# Patient Record
(demographics unavailable — no encounter records)

---

## 2024-11-19 NOTE — CONSULT LETTER
[Dear  ___] : Dear  [unfilled], [Courtesy Letter:] : I had the pleasure of seeing your patient, [unfilled], in my office today. [Please see my note below.] : Please see my note below. [Consult Closing:] : Thank you very much for allowing me to participate in the care of this patient.  If you have any questions, please do not hesitate to contact me. [Sincerely,] : Sincerely, [FreeTextEntry2] : Dr Soares Blank  [FreeTextEntry3] : \par  Kenrick Best MD, FACS\par  \par  Otolaryngology-Head and Neck Surgery\par  [employer] at [facility]\par

## 2024-11-19 NOTE — HISTORY OF PRESENT ILLNESS
[de-identified] : This is a 42 yro female patient who was referred by Dr. Mckeon for thyroid nodules.  FNA right thyroid  from 5/10/2022 was benign.  [place] [facility] [place]  labs 7/8/2023: TFTs were wnl CT neck done 12/18/2023 and 11/13/2024.  Pt noticed that her neck got "fatter" in the last few months, reports that she has also gained some weight.  Pt states the discomfort with swallowing when laying flat on back is still present, so she lays more on her side.  No dysphagia with food, pressure in the neck, odynophagia, choking, dysphonia or dyspnea. No fever, chills, or weight loss. Eating and drinking without issues.    [place] [facility] [place]:  IMPRESSION: heterogeneous appearance of the thyroid gland, which is enlarged and can be based on thyroiditis.  Bilateral TR3 and TR4  nodules which do not meet ACR criteria for biopsy.  (Right lobe: 1.2 x 1.0 x 0.8cm nodule and 1.8 x 1.5 x 1.4cm nodule) (Left lobe 1.8 x 1.8 x 1.2cm nodule and 2.3 x 1.8 x 1.5cm nodule )  Ct neck 12/18/23: FINDINGS: Thyroid gland: Heterogeneous enlarged thyroid. The right lobe measures 3.6 x 2.5 x 6.0 cm (AP by transverse by craniocaudal). The left lobe measures 6.1 x 6.3 x 9.3 cm. There is rightward tracheal deviation without luminal narrowing. There is minimal substernal extension of 0.4 cm ([id number]). Inferior extent of the lobe remain superior to the left brachiocephalic vein. IMPRESSION: Heterogeneous enlarged thyroid and the measurements provided above. Left lobe measures up to 9.3 cm craniocaudally with 0.4 cm substernal extension.  CT neck 11/13/2024: THYROID GLAND:  There is uniform enlargement noted of the bilateral thyroid lobes, left greater than right. The left thyroid lobe measures approximately 5.0 x 7.5 x 9.2 cm (AP x TR x CC) . The right thyroid lobe measures approximately 3.3 x 2.9 x 6.7 cm (AP x TR x CC). Overall this is not significant change in comparison to the prior CT. The left thyroid lobe extends to the level of the sternum without significant substernal extension. IMPRESSION: No significant change in comparison to the prior CT of 12/18/2023.  Stable appearance of the thyroid goiter without significant substernal extension or tracheal compression.

## 2024-11-19 NOTE — PHYSICAL EXAM
[Midline] : trachea located in midline position [Normal] : no rashes [de-identified] : Large L thyroid nodule which reaches the sterno-clavicular joint

## 2025-02-24 NOTE — HISTORY OF PRESENT ILLNESS
[de-identified] : 42 yro pt here for drain removal s/p left thyroid lobectomy on 2/19/2025. DEEPAK drain had 42.5cc of serosanguineous fluid on 2/20/25. There was 2.5cc on 2/21/25. Drain has not output anything since then.  Pt not on thyroid medication. Reports a bit of sore throat.  No dysphagia, dyspnea, dysphonia or fever.

## 2025-02-24 NOTE — PHYSICAL EXAM
[de-identified] : anterior neck steristrips intact. Some swelling above incsion. No erythema discharge or tenderness

## 2025-03-18 NOTE — HISTORY OF PRESENT ILLNESS
[de-identified] : This is a 42 yro female patient who was referred by Dr. Mckeon for thyroid nodules.  FNA right thyroid  from 5/10/2022 was benign.  CT neck showed large L thyroid goiter and tracheal deviation.  S/P left thyroid lobectomy on 2/19/2025 Today pt states throat feels different when talking for a long time, feels christie pitch does not go far.  No neck pain, throat pain, dysphagia, dyspnea or odynophonia. No fever or chills. Eating and drinking without issues.  Not on any thyroid meds.    PATHOLOGY 2/19/2025 Specimen(s) Submitted 1- Left thyroid lobe  Final Diagnosis 1.  Thyroid, left lobe, lobectomy       - Adenomatoid nodule (Thyroid follicular nodular disease)

## 2025-03-18 NOTE — PHYSICAL EXAM
[Midline] : trachea located in midline position [Normal] : no rashes [de-identified] : Incision healing well.

## 2025-03-18 NOTE — CONSULT LETTER
[Dear  ___] : Dear  [unfilled], [Courtesy Letter:] : I had the pleasure of seeing your patient, [unfilled], in my office today. [Please see my note below.] : Please see my note below. [Consult Closing:] : Thank you very much for allowing me to participate in the care of this patient.  If you have any questions, please do not hesitate to contact me. [Sincerely,] : Sincerely, [FreeTextEntry2] : Dr Soares Blank  [FreeTextEntry3] : \par  Kenrick Best MD, FACS\par  \par  Otolaryngology-Head and Neck Surgery\par  [employer] at [facility]\par